# Patient Record
Sex: FEMALE | Race: WHITE | Employment: FULL TIME | URBAN - METROPOLITAN AREA
[De-identification: names, ages, dates, MRNs, and addresses within clinical notes are randomized per-mention and may not be internally consistent; named-entity substitution may affect disease eponyms.]

---

## 2017-06-05 ENCOUNTER — OFFICE VISIT (OUTPATIENT)
Dept: NEUROLOGY | Facility: HOSPITAL | Age: 57
End: 2017-06-05
Attending: INTERNAL MEDICINE
Payer: COMMERCIAL

## 2017-06-05 ENCOUNTER — LAB VISIT (OUTPATIENT)
Dept: LAB | Facility: HOSPITAL | Age: 57
End: 2017-06-05
Attending: INTERNAL MEDICINE
Payer: COMMERCIAL

## 2017-06-05 VITALS
SYSTOLIC BLOOD PRESSURE: 145 MMHG | HEIGHT: 66 IN | HEART RATE: 99 BPM | TEMPERATURE: 97 F | DIASTOLIC BLOOD PRESSURE: 86 MMHG | BODY MASS INDEX: 25.88 KG/M2 | WEIGHT: 161 LBS

## 2017-06-05 DIAGNOSIS — D50.9 MICROCYTIC ANEMIA: Primary | ICD-10-CM

## 2017-06-05 DIAGNOSIS — D50.9 MICROCYTIC ANEMIA: ICD-10-CM

## 2017-06-05 LAB
ALBUMIN SERPL BCP-MCNC: 4 G/DL
ALP SERPL-CCNC: 77 U/L
ALT SERPL W/O P-5'-P-CCNC: 14 U/L
ANION GAP SERPL CALC-SCNC: 10 MMOL/L
ANISOCYTOSIS BLD QL SMEAR: SLIGHT
AST SERPL-CCNC: 15 U/L
BASOPHILS # BLD AUTO: 0.02 K/UL
BASOPHILS NFR BLD: 0.2 %
BILIRUB SERPL-MCNC: 0.4 MG/DL
BUN SERPL-MCNC: 14 MG/DL
CALCIUM SERPL-MCNC: 9.4 MG/DL
CHLORIDE SERPL-SCNC: 105 MMOL/L
CO2 SERPL-SCNC: 25 MMOL/L
CREAT SERPL-MCNC: 0.8 MG/DL
DIFFERENTIAL METHOD: ABNORMAL
EOSINOPHIL # BLD AUTO: 0.4 K/UL
EOSINOPHIL NFR BLD: 4.2 %
ERYTHROCYTE [DISTWIDTH] IN BLOOD BY AUTOMATED COUNT: 18.1 %
EST. GFR  (AFRICAN AMERICAN): >60 ML/MIN/1.73 M^2
EST. GFR  (NON AFRICAN AMERICAN): >60 ML/MIN/1.73 M^2
FERRITIN SERPL-MCNC: 23 NG/ML
GLUCOSE SERPL-MCNC: 97 MG/DL
HCT VFR BLD AUTO: 32.4 %
HGB BLD-MCNC: 10.3 G/DL
IRON SERPL-MCNC: 72 UG/DL
LYMPHOCYTES # BLD AUTO: 2.1 K/UL
LYMPHOCYTES NFR BLD: 24.9 %
MCH RBC QN AUTO: 21.8 PG
MCHC RBC AUTO-ENTMCNC: 31.8 %
MCV RBC AUTO: 69 FL
MONOCYTES # BLD AUTO: 0.4 K/UL
MONOCYTES NFR BLD: 5.3 %
NEUTROPHILS # BLD AUTO: 5.4 K/UL
NEUTROPHILS NFR BLD: 65.4 %
OVALOCYTES BLD QL SMEAR: ABNORMAL
PLATELET # BLD AUTO: 308 K/UL
PLATELET BLD QL SMEAR: ABNORMAL
PMV BLD AUTO: 9.3 FL
POIKILOCYTOSIS BLD QL SMEAR: SLIGHT
POTASSIUM SERPL-SCNC: 3.6 MMOL/L
PROT SERPL-MCNC: 8.5 G/DL
RBC # BLD AUTO: 4.72 M/UL
SATURATED IRON: 15 %
SODIUM SERPL-SCNC: 140 MMOL/L
TOTAL IRON BINDING CAPACITY: 491 UG/DL
TRANSFERRIN SERPL-MCNC: 332 MG/DL
WBC # BLD AUTO: 8.34 K/UL

## 2017-06-05 PROCEDURE — 85025 COMPLETE CBC W/AUTO DIFF WBC: CPT

## 2017-06-05 PROCEDURE — 83540 ASSAY OF IRON: CPT

## 2017-06-05 PROCEDURE — 83020 HEMOGLOBIN ELECTROPHORESIS: CPT

## 2017-06-05 PROCEDURE — 36415 COLL VENOUS BLD VENIPUNCTURE: CPT

## 2017-06-05 PROCEDURE — 99204 OFFICE O/P NEW MOD 45 MIN: CPT | Performed by: INTERNAL MEDICINE

## 2017-06-05 PROCEDURE — 82728 ASSAY OF FERRITIN: CPT

## 2017-06-05 PROCEDURE — 80053 COMPREHEN METABOLIC PANEL: CPT

## 2017-06-05 RX ORDER — LATANOPROST 50 UG/ML
SOLUTION/ DROPS OPHTHALMIC
COMMUNITY
Start: 2016-11-30

## 2017-06-05 RX ORDER — DICLOFENAC SODIUM 20 MG/G
40 SOLUTION TOPICAL
COMMUNITY
Start: 2016-03-11 | End: 2017-06-05

## 2017-06-05 RX ORDER — PRAVASTATIN SODIUM 20 MG/1
TABLET ORAL
COMMUNITY
Start: 2017-03-15

## 2017-06-05 RX ORDER — TRIAMCINOLONE ACETONIDE 1 MG/G
CREAM TOPICAL
COMMUNITY
Start: 2017-02-16 | End: 2018-02-16

## 2017-06-05 RX ORDER — AMLODIPINE BESYLATE 5 MG/1
TABLET ORAL
COMMUNITY
Start: 2017-03-15

## 2017-06-05 NOTE — PROGRESS NOTES
"[facility]    CC: Anemia    HPI 57 y.o. female with 2 year history of moderate severity anemia that is stable but unimproving on oral iron therapy, associated initially with a hospitalization and transfusion of 6 units of blood as well as hematochezia. Since that one episode 2 years ago, she's had no further overt GI blood loss. She states that she has been anemic for many, many years. She was started on iron by her PCP about a year before the hospitalization 2 years ago and has remained on it since, but has not had significant increase in counts. For that 1st episode she underwent EGD and Colonoscopy, which were reportedly normal. She then underwent VCE that reportedly revealed ulcers in small bowel. Since this time she has not required further transfusion. When her counts failed to improved a repeat EGD and colonoscopy were performed and were again reportedly normal.     Medical hx  HTN  HLD    Surgical History  Myomectomy  MARI for SBO    Social History  No t/e/d    Family Hx  No hx of anemia     Review of Systems  General ROS: negative for chills, fever or weight loss  Psychological ROS: negative for hallucination, depression or suicidal ideation  Ophthalmic ROS: negative for blurry vision, photophobia or eye pain  ENT ROS: negative for epistaxis, sore throat or rhinorrhea  Respiratory ROS: no cough, shortness of breath, or wheezing  Cardiovascular ROS: no chest pain or dyspnea on exertion  Gastrointestinal ROS: no abdominal pain, change in bowel habits, +dark green stools on iron, unchanged  Genito-Urinary ROS: no dysuria, trouble voiding, or hematuria  Musculoskeletal ROS: negative for gait disturbance or muscular weakness  Neurological ROS: no syncope or seizures; no ataxia  Dermatological ROS: negative for pruritis, rash and jaundice    Physical Examination  BP (!) 145/86   Pulse 99   Temp 97.4 °F (36.3 °C) (Oral)   Ht 5' 6" (1.676 m)   Wt 73 kg (161 lb)   BMI 25.99 kg/m²   General appearance: " alert, cooperative, no distress  HENT: Normocephalic, atraumatic, neck symmetrical, no nasal discharge. No hamartomas on lips  Eyes: conjunctivae/corneas clear, PERRL, EOM's intact  Lungs: clear to auscultation bilaterally, no dullness to percussion bilaterally  Heart: regular rate and rhythm without rub; no displacement of the PMI   Abdomen: soft, non-tender; bowel sounds normoactive; no organomegaly  Extremities: extremities symmetric; no clubbing, cyanosis, or edema  Integument: Skin color, texture, turgor normal; no rashes; hair distrubution normal  Neurologic: Alert and oriented X 3, normal strength, normal coordination and gait  Psychiatric: no pressured speech; normal affect; no evidence of impaired cognition     Labs:  Outside labs reviewed:  Hb: 9.7  MCV: 72  Plts: 284    Hx obtained from  reveals she has been on oral iron for about 3 years    Outside Chart Reviewed:  EGD 3/2017 with gastric polyps, biopsy: fundic gland polyps  Random duodenal bx were normal    Colonoscopy 3/2017 revealed only 3 HPs, good prep.    Assessment:   58 yo female with a mild, microcytic anemia, normal EGD and colonscopies. She is asymptomatic with unrevealing EGD, Colonoscopy, and VCE. Her history of anemia is >20 years in duration and is suspicious for thalassemia (unable to donate blood in the past)     Plan:   -Further characterize anemia with iron studies and repeat CBC.   -Given hx of lifelong anemia, check Hb electrophoresis for a thalassemia   -If anemia remains stable and asymptomatic, no further work up or treatment may be necessary  - Patient referred by Dr. Helen Olmstead MD   [address] [place] [address], [address]   [place] [address] [phone]

## 2017-06-05 NOTE — PATIENT INSTRUCTIONS
Have labs today at [facility] on the first floor. Dr Olmstead will call you with results

## 2017-06-08 LAB
HGB A2 MFR BLD HPLC: 2.1 %
HGB FRACT BLD ELPH-IMP: ABNORMAL
HGB FRACT BLD ELPH-IMP: ABNORMAL

## 2017-06-14 ENCOUNTER — TELEPHONE (OUTPATIENT)
Dept: NEUROLOGY | Facility: HOSPITAL | Age: 57
End: 2017-06-14

## 2017-06-14 DIAGNOSIS — D50.9 IRON DEFICIENCY ANEMIA, UNSPECIFIED IRON DEFICIENCY ANEMIA TYPE: Primary | ICD-10-CM

## 2017-06-14 NOTE — TELEPHONE ENCOUNTER
[facility] Note    I have discussed with the patient that she may have alpha thalassemia.  I would like her to start daily iron 325mg and plan for a repeat CBC in 2 months, around mid August 2017.